# Patient Record
Sex: FEMALE | Race: WHITE | NOT HISPANIC OR LATINO
[De-identification: names, ages, dates, MRNs, and addresses within clinical notes are randomized per-mention and may not be internally consistent; named-entity substitution may affect disease eponyms.]

---

## 2017-09-11 ENCOUNTER — TRANSCRIPTION ENCOUNTER (OUTPATIENT)
Age: 27
End: 2017-09-11

## 2020-04-20 ENCOUNTER — ASOB RESULT (OUTPATIENT)
Age: 30
End: 2020-04-20

## 2020-04-20 ENCOUNTER — APPOINTMENT (OUTPATIENT)
Dept: ANTEPARTUM | Facility: CLINIC | Age: 30
End: 2020-04-20
Payer: COMMERCIAL

## 2020-04-20 ENCOUNTER — TRANSCRIPTION ENCOUNTER (OUTPATIENT)
Age: 30
End: 2020-04-20

## 2020-04-20 PROBLEM — Z00.00 ENCOUNTER FOR PREVENTIVE HEALTH EXAMINATION: Status: ACTIVE | Noted: 2020-04-20

## 2020-04-20 PROCEDURE — 76813 OB US NUCHAL MEAS 1 GEST: CPT

## 2020-06-24 ENCOUNTER — APPOINTMENT (OUTPATIENT)
Dept: ANTEPARTUM | Facility: CLINIC | Age: 30
End: 2020-06-24
Payer: COMMERCIAL

## 2020-06-24 ENCOUNTER — ASOB RESULT (OUTPATIENT)
Age: 30
End: 2020-06-24

## 2020-06-24 PROCEDURE — 76805 OB US >/= 14 WKS SNGL FETUS: CPT

## 2020-07-06 ENCOUNTER — APPOINTMENT (OUTPATIENT)
Dept: ANTEPARTUM | Facility: CLINIC | Age: 30
End: 2020-07-06
Payer: COMMERCIAL

## 2020-07-06 ENCOUNTER — ASOB RESULT (OUTPATIENT)
Age: 30
End: 2020-07-06

## 2020-07-06 PROCEDURE — 76816 OB US FOLLOW-UP PER FETUS: CPT

## 2020-09-15 ENCOUNTER — ASOB RESULT (OUTPATIENT)
Age: 30
End: 2020-09-15

## 2020-09-15 ENCOUNTER — APPOINTMENT (OUTPATIENT)
Dept: ANTEPARTUM | Facility: CLINIC | Age: 30
End: 2020-09-15
Payer: COMMERCIAL

## 2020-09-15 PROCEDURE — 76819 FETAL BIOPHYS PROFIL W/O NST: CPT

## 2020-09-15 PROCEDURE — 76820 UMBILICAL ARTERY ECHO: CPT

## 2020-09-15 PROCEDURE — 76816 OB US FOLLOW-UP PER FETUS: CPT

## 2020-10-13 ENCOUNTER — APPOINTMENT (OUTPATIENT)
Dept: ANTEPARTUM | Facility: CLINIC | Age: 30
End: 2020-10-13
Payer: COMMERCIAL

## 2020-10-13 ENCOUNTER — ASOB RESULT (OUTPATIENT)
Age: 30
End: 2020-10-13

## 2020-10-13 PROCEDURE — 76819 FETAL BIOPHYS PROFIL W/O NST: CPT

## 2020-10-13 PROCEDURE — 76816 OB US FOLLOW-UP PER FETUS: CPT

## 2020-10-14 ENCOUNTER — TRANSCRIPTION ENCOUNTER (OUTPATIENT)
Age: 30
End: 2020-10-14

## 2020-10-14 ENCOUNTER — INPATIENT (INPATIENT)
Facility: HOSPITAL | Age: 30
LOS: 1 days | Discharge: HOME | End: 2020-10-16
Attending: OBSTETRICS & GYNECOLOGY | Admitting: OBSTETRICS & GYNECOLOGY
Payer: COMMERCIAL

## 2020-10-14 VITALS
RESPIRATION RATE: 18 BRPM | SYSTOLIC BLOOD PRESSURE: 134 MMHG | HEART RATE: 100 BPM | TEMPERATURE: 99 F | DIASTOLIC BLOOD PRESSURE: 82 MMHG

## 2020-10-14 DIAGNOSIS — J45.909 UNSPECIFIED ASTHMA, UNCOMPLICATED: ICD-10-CM

## 2020-10-14 DIAGNOSIS — O26.893 OTHER SPECIFIED PREGNANCY RELATED CONDITIONS, THIRD TRIMESTER: ICD-10-CM

## 2020-10-14 DIAGNOSIS — Z3A.37 37 WEEKS GESTATION OF PREGNANCY: ICD-10-CM

## 2020-10-14 LAB
ALBUMIN SERPL ELPH-MCNC: 2.9 G/DL — LOW (ref 3.3–5)
ALP SERPL-CCNC: 187 U/L — HIGH (ref 40–120)
ALT FLD-CCNC: 15 U/L — SIGNIFICANT CHANGE UP (ref 12–78)
ANION GAP SERPL CALC-SCNC: 11 MMOL/L — SIGNIFICANT CHANGE UP (ref 5–17)
AST SERPL-CCNC: 13 U/L — LOW (ref 15–37)
BASOPHILS # BLD AUTO: 0.03 K/UL — SIGNIFICANT CHANGE UP (ref 0–0.2)
BASOPHILS NFR BLD AUTO: 0.2 % — SIGNIFICANT CHANGE UP (ref 0–2)
BILIRUB SERPL-MCNC: 0.7 MG/DL — SIGNIFICANT CHANGE UP (ref 0.2–1.2)
BUN SERPL-MCNC: 8 MG/DL — SIGNIFICANT CHANGE UP (ref 7–23)
CALCIUM SERPL-MCNC: 9.1 MG/DL — SIGNIFICANT CHANGE UP (ref 8.5–10.1)
CHLORIDE SERPL-SCNC: 110 MMOL/L — HIGH (ref 96–108)
CO2 SERPL-SCNC: 19 MMOL/L — LOW (ref 22–31)
CREAT SERPL-MCNC: 0.7 MG/DL — SIGNIFICANT CHANGE UP (ref 0.5–1.3)
EOSINOPHIL # BLD AUTO: 0.03 K/UL — SIGNIFICANT CHANGE UP (ref 0–0.5)
EOSINOPHIL NFR BLD AUTO: 0.2 % — SIGNIFICANT CHANGE UP (ref 0–6)
GLUCOSE SERPL-MCNC: 75 MG/DL — SIGNIFICANT CHANGE UP (ref 70–99)
HCT VFR BLD CALC: 39.3 % — SIGNIFICANT CHANGE UP (ref 34.5–45)
HGB BLD-MCNC: 13.3 G/DL — SIGNIFICANT CHANGE UP (ref 11.5–15.5)
IMM GRANULOCYTES NFR BLD AUTO: 0.5 % — SIGNIFICANT CHANGE UP (ref 0–1.5)
LYMPHOCYTES # BLD AUTO: 1.87 K/UL — SIGNIFICANT CHANGE UP (ref 1–3.3)
LYMPHOCYTES # BLD AUTO: 12.9 % — LOW (ref 13–44)
MCHC RBC-ENTMCNC: 29.4 PG — SIGNIFICANT CHANGE UP (ref 27–34)
MCHC RBC-ENTMCNC: 33.8 GM/DL — SIGNIFICANT CHANGE UP (ref 32–36)
MCV RBC AUTO: 86.9 FL — SIGNIFICANT CHANGE UP (ref 80–100)
MONOCYTES # BLD AUTO: 0.68 K/UL — SIGNIFICANT CHANGE UP (ref 0–0.9)
MONOCYTES NFR BLD AUTO: 4.7 % — SIGNIFICANT CHANGE UP (ref 2–14)
NEUTROPHILS # BLD AUTO: 11.78 K/UL — HIGH (ref 1.8–7.4)
NEUTROPHILS NFR BLD AUTO: 81.5 % — HIGH (ref 43–77)
PLATELET # BLD AUTO: 194 K/UL — SIGNIFICANT CHANGE UP (ref 150–400)
POTASSIUM SERPL-MCNC: 3.6 MMOL/L — SIGNIFICANT CHANGE UP (ref 3.5–5.3)
POTASSIUM SERPL-SCNC: 3.6 MMOL/L — SIGNIFICANT CHANGE UP (ref 3.5–5.3)
PROT SERPL-MCNC: 7.6 GM/DL — SIGNIFICANT CHANGE UP (ref 6–8.3)
RBC # BLD: 4.52 M/UL — SIGNIFICANT CHANGE UP (ref 3.8–5.2)
RBC # FLD: 13.8 % — SIGNIFICANT CHANGE UP (ref 10.3–14.5)
SARS-COV-2 RNA SPEC QL NAA+PROBE: SIGNIFICANT CHANGE UP
SODIUM SERPL-SCNC: 140 MMOL/L — SIGNIFICANT CHANGE UP (ref 135–145)
T PALLIDUM AB TITR SER: NEGATIVE — SIGNIFICANT CHANGE UP
WBC # BLD: 14.46 K/UL — HIGH (ref 3.8–10.5)
WBC # FLD AUTO: 14.46 K/UL — HIGH (ref 3.8–10.5)

## 2020-10-14 PROCEDURE — 85014 HEMATOCRIT: CPT

## 2020-10-14 PROCEDURE — G0463: CPT

## 2020-10-14 PROCEDURE — 36415 COLL VENOUS BLD VENIPUNCTURE: CPT

## 2020-10-14 PROCEDURE — U0003: CPT

## 2020-10-14 PROCEDURE — 94760 N-INVAS EAR/PLS OXIMETRY 1: CPT

## 2020-10-14 PROCEDURE — 80053 COMPREHEN METABOLIC PANEL: CPT

## 2020-10-14 PROCEDURE — 86900 BLOOD TYPING SEROLOGIC ABO: CPT

## 2020-10-14 PROCEDURE — C1889: CPT

## 2020-10-14 PROCEDURE — 86769 SARS-COV-2 COVID-19 ANTIBODY: CPT

## 2020-10-14 PROCEDURE — 86901 BLOOD TYPING SEROLOGIC RH(D): CPT

## 2020-10-14 PROCEDURE — 85018 HEMOGLOBIN: CPT

## 2020-10-14 PROCEDURE — 86850 RBC ANTIBODY SCREEN: CPT

## 2020-10-14 PROCEDURE — 86780 TREPONEMA PALLIDUM: CPT

## 2020-10-14 PROCEDURE — 85025 COMPLETE CBC W/AUTO DIFF WBC: CPT

## 2020-10-14 PROCEDURE — 59050 FETAL MONITOR W/REPORT: CPT

## 2020-10-14 RX ORDER — CITRIC ACID/SODIUM CITRATE 300-500 MG
15 SOLUTION, ORAL ORAL EVERY 6 HOURS
Refills: 0 | Status: DISCONTINUED | OUTPATIENT
Start: 2020-10-14 | End: 2020-10-14

## 2020-10-14 RX ORDER — ALBUTEROL 90 UG/1
2 AEROSOL, METERED ORAL EVERY 6 HOURS
Refills: 0 | Status: DISCONTINUED | OUTPATIENT
Start: 2020-10-14 | End: 2020-10-16

## 2020-10-14 RX ORDER — FAMOTIDINE 10 MG/ML
20 INJECTION INTRAVENOUS
Refills: 0 | Status: DISCONTINUED | OUTPATIENT
Start: 2020-10-14 | End: 2020-10-16

## 2020-10-14 RX ORDER — SENNA PLUS 8.6 MG/1
2 TABLET ORAL AT BEDTIME
Refills: 0 | Status: DISCONTINUED | OUTPATIENT
Start: 2020-10-14 | End: 2020-10-16

## 2020-10-14 RX ORDER — AER TRAVELER 0.5 G/1
1 SOLUTION RECTAL; TOPICAL EVERY 4 HOURS
Refills: 0 | Status: DISCONTINUED | OUTPATIENT
Start: 2020-10-14 | End: 2020-10-16

## 2020-10-14 RX ORDER — LANOLIN
1 OINTMENT (GRAM) TOPICAL EVERY 6 HOURS
Refills: 0 | Status: DISCONTINUED | OUTPATIENT
Start: 2020-10-14 | End: 2020-10-16

## 2020-10-14 RX ORDER — ACETAMINOPHEN 500 MG
975 TABLET ORAL
Refills: 0 | Status: DISCONTINUED | OUTPATIENT
Start: 2020-10-14 | End: 2020-10-16

## 2020-10-14 RX ORDER — SODIUM CHLORIDE 9 MG/ML
3 INJECTION INTRAMUSCULAR; INTRAVENOUS; SUBCUTANEOUS EVERY 8 HOURS
Refills: 0 | Status: DISCONTINUED | OUTPATIENT
Start: 2020-10-14 | End: 2020-10-14

## 2020-10-14 RX ORDER — IBUPROFEN 200 MG
600 TABLET ORAL EVERY 6 HOURS
Refills: 0 | Status: COMPLETED | OUTPATIENT
Start: 2020-10-14 | End: 2021-09-12

## 2020-10-14 RX ORDER — OXYCODONE HYDROCHLORIDE 5 MG/1
5 TABLET ORAL ONCE
Refills: 0 | Status: DISCONTINUED | OUTPATIENT
Start: 2020-10-14 | End: 2020-10-16

## 2020-10-14 RX ORDER — DIBUCAINE 1 %
1 OINTMENT (GRAM) RECTAL EVERY 6 HOURS
Refills: 0 | Status: DISCONTINUED | OUTPATIENT
Start: 2020-10-14 | End: 2020-10-16

## 2020-10-14 RX ORDER — SIMETHICONE 80 MG/1
80 TABLET, CHEWABLE ORAL EVERY 4 HOURS
Refills: 0 | Status: DISCONTINUED | OUTPATIENT
Start: 2020-10-14 | End: 2020-10-16

## 2020-10-14 RX ORDER — DIPHENHYDRAMINE HCL 50 MG
25 CAPSULE ORAL EVERY 6 HOURS
Refills: 0 | Status: DISCONTINUED | OUTPATIENT
Start: 2020-10-14 | End: 2020-10-16

## 2020-10-14 RX ORDER — HYDROCORTISONE 1 %
1 OINTMENT (GRAM) TOPICAL EVERY 6 HOURS
Refills: 0 | Status: DISCONTINUED | OUTPATIENT
Start: 2020-10-14 | End: 2020-10-16

## 2020-10-14 RX ORDER — MAGNESIUM HYDROXIDE 400 MG/1
30 TABLET, CHEWABLE ORAL
Refills: 0 | Status: DISCONTINUED | OUTPATIENT
Start: 2020-10-14 | End: 2020-10-16

## 2020-10-14 RX ORDER — KETOROLAC TROMETHAMINE 30 MG/ML
30 SYRINGE (ML) INJECTION ONCE
Refills: 0 | Status: DISCONTINUED | OUTPATIENT
Start: 2020-10-14 | End: 2020-10-14

## 2020-10-14 RX ORDER — AMPICILLIN TRIHYDRATE 250 MG
2 CAPSULE ORAL ONCE
Refills: 0 | Status: COMPLETED | OUTPATIENT
Start: 2020-10-14 | End: 2020-10-14

## 2020-10-14 RX ORDER — NIFEDIPINE 30 MG
30 TABLET, EXTENDED RELEASE 24 HR ORAL DAILY
Refills: 0 | Status: DISCONTINUED | OUTPATIENT
Start: 2020-10-14 | End: 2020-10-16

## 2020-10-14 RX ORDER — OXYTOCIN 10 UNIT/ML
333.33 VIAL (ML) INJECTION
Qty: 20 | Refills: 0 | Status: DISCONTINUED | OUTPATIENT
Start: 2020-10-14 | End: 2020-10-16

## 2020-10-14 RX ORDER — BENZOCAINE 10 %
1 GEL (GRAM) MUCOUS MEMBRANE EVERY 6 HOURS
Refills: 0 | Status: DISCONTINUED | OUTPATIENT
Start: 2020-10-14 | End: 2020-10-16

## 2020-10-14 RX ORDER — MONTELUKAST 4 MG/1
10 TABLET, CHEWABLE ORAL AT BEDTIME
Refills: 0 | Status: DISCONTINUED | OUTPATIENT
Start: 2020-10-14 | End: 2020-10-16

## 2020-10-14 RX ORDER — IBUPROFEN 200 MG
600 TABLET ORAL EVERY 6 HOURS
Refills: 0 | Status: DISCONTINUED | OUTPATIENT
Start: 2020-10-14 | End: 2020-10-16

## 2020-10-14 RX ORDER — PRAMOXINE HYDROCHLORIDE 150 MG/15G
1 AEROSOL, FOAM RECTAL EVERY 4 HOURS
Refills: 0 | Status: DISCONTINUED | OUTPATIENT
Start: 2020-10-14 | End: 2020-10-16

## 2020-10-14 RX ORDER — SODIUM CHLORIDE 9 MG/ML
1000 INJECTION, SOLUTION INTRAVENOUS
Refills: 0 | Status: DISCONTINUED | OUTPATIENT
Start: 2020-10-14 | End: 2020-10-14

## 2020-10-14 RX ORDER — AMPICILLIN TRIHYDRATE 250 MG
1 CAPSULE ORAL EVERY 4 HOURS
Refills: 0 | Status: DISCONTINUED | OUTPATIENT
Start: 2020-10-14 | End: 2020-10-14

## 2020-10-14 RX ADMIN — Medication 216 GRAM(S): at 10:05

## 2020-10-14 RX ADMIN — Medication 30 MILLIGRAM(S): at 13:21

## 2020-10-14 RX ADMIN — Medication 975 MILLIGRAM(S): at 20:34

## 2020-10-14 RX ADMIN — MONTELUKAST 10 MILLIGRAM(S): 4 TABLET, CHEWABLE ORAL at 22:04

## 2020-10-14 RX ADMIN — Medication 1000 MILLIUNIT(S)/MIN: at 12:57

## 2020-10-14 RX ADMIN — SODIUM CHLORIDE 125 MILLILITER(S): 9 INJECTION, SOLUTION INTRAVENOUS at 10:45

## 2020-10-14 RX ADMIN — SODIUM CHLORIDE 125 MILLILITER(S): 9 INJECTION, SOLUTION INTRAVENOUS at 09:55

## 2020-10-14 NOTE — DISCHARGE NOTE OB - MEDICATION SUMMARY - MEDICATIONS TO TAKE
I will START or STAY ON the medications listed below when I get home from the hospital:    Procardia XL 30 mg oral tablet, extended release  -- 1 tab(s) by mouth once a day  -- Indication: For continue for hypertension    Prenatal 1 Plus 1 oral tablet  -- 1 tab(s) by mouth once a day  -- Indication: For continue daily

## 2020-10-14 NOTE — DISCHARGE NOTE OB - PLAN OF CARE
recovery nothing per vagina for six weeks.  Please follow up in six weeks in the office for your postpartum exam.

## 2020-10-14 NOTE — DISCHARGE NOTE OB - CARE PROVIDER_API CALL
Jonathan Olivo  OBSTETRICS AND GYNECOLOGY  [address] [facility], [address] [place] [address]  Phone: [phone]  Fax: [phone]  Established Patient  Scheduled Appointment: 11/18/2020

## 2020-10-14 NOTE — DISCHARGE NOTE OB - HOSPITAL COURSE
29 yo  at 37 5/7 weeks gestation, GBS negative with essential HTN and asthma, presented c/o leaking fluid since 0600 and painful contractions.  Her cervix was dilated to 5cm upon arrival.  membranes were noted to be grossly ruptured.  She received epidural aanesthesia and progressed in labor. Her CBC upon admission:                        13.3   14.46 )-----------( 194      ( 14 Oct 2020 10:07 )             39.3    31 yo  at 37 5/7 weeks gestation, GBS negative with essential HTN and asthma, presented c/o leaking fluid since 0600 and painful contractions.  Her cervix was dilated to 5cm upon arrival.  membranes were noted to be grossly ruptured.  She received epidural aanesthesia and progressed in labor. Her CBC upon admission:                        13.3   14.46 )-----------( 194      ( 14 Oct 2020 10:07 )             39.3   Pt received pudendal block at full dilatation and pushed briefly to deliver a viable male infant from LOP position, with no nuchal cord, over a first degree laceration of the perineum at 12:47, Apgars 9/9. 29 yo  at 37 5/7 weeks gestation, GBS negative with essential HTN and asthma, presented c/o leaking fluid since 0600 and painful contractions.  Her cervix was dilated to 5cm upon arrival.  membranes were noted to be grossly ruptured.  She received epidural aanesthesia and progressed in labor. Her CBC upon admission:                        13.3   14.46 )-----------( 194      ( 14 Oct 2020 10:07 )             39.3   Pt received pudendal block at full dilatation and pushed briefly to deliver a viable male infant from LOP position, with no nuchal cord, over a first degree laceration of the perineum at 12:47, Apgars 9/9.  Her recovery was uneventful and she is being discharged home in stable condition on postpartum day #2.

## 2020-10-14 NOTE — DISCHARGE NOTE OB - PATIENT PORTAL LINK FT
You can access the FollowMyHealth Patient Portal offered by [facility] by registering at the following website: [email or web address]. By joining [employer]’s FollowMyHealth portal, you will also be able to view your health information using other applications (apps) compatible with our system.

## 2020-10-14 NOTE — DISCHARGE NOTE OB - CARE PLAN
Principal Discharge DX:	Vaginal delivery  Goal:	recovery  Assessment and plan of treatment:	nothing per vagina for six weeks.  Please follow up in six weeks in the office for your postpartum exam.

## 2020-10-15 LAB
HCT VFR BLD CALC: 33.2 % — LOW (ref 34.5–45)
HGB BLD-MCNC: 10.7 G/DL — LOW (ref 11.5–15.5)
SARS-COV-2 IGG SERPL QL IA: NEGATIVE — SIGNIFICANT CHANGE UP
SARS-COV-2 IGM SERPL IA-ACNC: 0.02 INDEX — SIGNIFICANT CHANGE UP

## 2020-10-15 RX ORDER — IBUPROFEN 200 MG
1 TABLET ORAL
Qty: 30 | Refills: 1
Start: 2020-10-15 | End: 2020-12-13

## 2020-10-15 RX ADMIN — Medication 975 MILLIGRAM(S): at 08:30

## 2020-10-15 RX ADMIN — Medication 30 MILLIGRAM(S): at 10:31

## 2020-10-15 RX ADMIN — Medication 1 TABLET(S): at 10:31

## 2020-10-15 RX ADMIN — Medication 975 MILLIGRAM(S): at 15:45

## 2020-10-15 RX ADMIN — Medication 975 MILLIGRAM(S): at 09:30

## 2020-10-15 RX ADMIN — SENNA PLUS 2 TABLET(S): 8.6 TABLET ORAL at 21:32

## 2020-10-15 RX ADMIN — Medication 975 MILLIGRAM(S): at 21:33

## 2020-10-15 RX ADMIN — Medication 975 MILLIGRAM(S): at 14:51

## 2020-10-15 RX ADMIN — Medication 600 MILLIGRAM(S): at 00:55

## 2020-10-15 RX ADMIN — Medication 600 MILLIGRAM(S): at 17:30

## 2020-10-15 RX ADMIN — Medication 600 MILLIGRAM(S): at 00:25

## 2020-10-15 RX ADMIN — MONTELUKAST 10 MILLIGRAM(S): 4 TABLET, CHEWABLE ORAL at 21:32

## 2020-10-15 RX ADMIN — Medication 600 MILLIGRAM(S): at 11:53

## 2020-10-15 RX ADMIN — Medication 975 MILLIGRAM(S): at 22:25

## 2020-10-15 NOTE — PROGRESS NOTE ADULT - SUBJECTIVE AND OBJECTIVE BOX
PPD #1      Tolerating uterine cramps.      T(C): 36.3 (10-15-20 @ 12:29), Max: 37 (10-15-20 @ 08:27)  HR: 83 (10-15-20 @ 12:29) (77 - 95)  BP: 122/77 (10-15-20 @ 12:29) (109/67 - 137/89)  RR: 17 (10-15-20 @ 12:29) (17 - 18)  SpO2: 96% (10-15-20 @ 12:29) (96% - 99%)  Wt(kg): --    Abd:  soft, NT  Fundus:  firm  - Lochia- Minimal  Extr:  no Pramod's sign, +1 pedal edema                        10.7   x     )-----------( x        ( 15 Oct 2020 07:16 )             33.2         A/P:  PPD#1       routine postpartum care and education  Breast feeding support and education.  Anticipate discharge home tomorrow.

## 2020-10-16 VITALS
RESPIRATION RATE: 18 BRPM | OXYGEN SATURATION: 98 % | DIASTOLIC BLOOD PRESSURE: 84 MMHG | SYSTOLIC BLOOD PRESSURE: 137 MMHG | HEART RATE: 79 BPM | TEMPERATURE: 99 F

## 2020-10-16 RX ADMIN — Medication 600 MILLIGRAM(S): at 01:25

## 2020-10-16 RX ADMIN — Medication 975 MILLIGRAM(S): at 10:27

## 2020-10-16 RX ADMIN — Medication 600 MILLIGRAM(S): at 07:15

## 2020-10-16 RX ADMIN — Medication 600 MILLIGRAM(S): at 06:18

## 2020-10-16 RX ADMIN — Medication 600 MILLIGRAM(S): at 00:25

## 2020-10-16 RX ADMIN — Medication 975 MILLIGRAM(S): at 09:45

## 2020-10-16 NOTE — PROGRESS NOTE ADULT - SUBJECTIVE AND OBJECTIVE BOX
PPD #2      eager to go home.    T(C): 37 (10-16-20 @ 08:55), Max: 37.1 (10-15-20 @ 16:30)  HR: 79 (10-16-20 @ 08:55) (70 - 85)  BP: 137/84 (10-16-20 @ 08:55) (122/75 - 137/84)  RR: 18 (10-16-20 @ 08:55) (17 - 18)  SpO2: 98% (10-16-20 @ 08:55) (96% - 98%)  Wt(kg): --    Abd:  soft, NT  Fundus:  firm  - Lochia- Minimal  Extr:  no Pramod's sign, no pedal edema                        10.7   x     )-----------( x        ( 15 Oct 2020 07:16 )             33.2         A/P:  PPD#2     , with first degree perineal laceration repair.  STable for discharge home  routine postpartum care and education  Breast feeding support and education.

## 2021-02-09 ENCOUNTER — INPATIENT (INPATIENT)
Facility: HOSPITAL | Age: 31
LOS: 0 days | Discharge: HOME | DRG: 419 | End: 2021-02-10
Attending: SURGERY | Admitting: SURGERY
Payer: COMMERCIAL

## 2021-02-09 ENCOUNTER — TRANSCRIPTION ENCOUNTER (OUTPATIENT)
Age: 31
End: 2021-02-09

## 2021-02-09 VITALS
HEIGHT: 66 IN | RESPIRATION RATE: 20 BRPM | WEIGHT: 175.05 LBS | TEMPERATURE: 98 F | SYSTOLIC BLOOD PRESSURE: 131 MMHG | HEART RATE: 126 BPM | OXYGEN SATURATION: 98 % | DIASTOLIC BLOOD PRESSURE: 87 MMHG

## 2021-02-09 DIAGNOSIS — K81.0 ACUTE CHOLECYSTITIS: ICD-10-CM

## 2021-02-09 LAB
ALBUMIN SERPL ELPH-MCNC: 4.4 G/DL — SIGNIFICANT CHANGE UP (ref 3.3–5.2)
ALP SERPL-CCNC: 147 U/L — HIGH (ref 40–120)
ALT FLD-CCNC: 38 U/L — HIGH
ANION GAP SERPL CALC-SCNC: 15 MMOL/L — SIGNIFICANT CHANGE UP (ref 5–17)
APPEARANCE UR: ABNORMAL
AST SERPL-CCNC: 28 U/L — SIGNIFICANT CHANGE UP
BACTERIA # UR AUTO: ABNORMAL
BASOPHILS # BLD AUTO: 0.04 K/UL — SIGNIFICANT CHANGE UP (ref 0–0.2)
BASOPHILS NFR BLD AUTO: 0.3 % — SIGNIFICANT CHANGE UP (ref 0–2)
BILIRUB SERPL-MCNC: 0.6 MG/DL — SIGNIFICANT CHANGE UP (ref 0.4–2)
BILIRUB UR-MCNC: NEGATIVE — SIGNIFICANT CHANGE UP
BLD GP AB SCN SERPL QL: SIGNIFICANT CHANGE UP
BUN SERPL-MCNC: 15 MG/DL — SIGNIFICANT CHANGE UP (ref 8–20)
CALCIUM SERPL-MCNC: 9.4 MG/DL — SIGNIFICANT CHANGE UP (ref 8.6–10.2)
CHLORIDE SERPL-SCNC: 103 MMOL/L — SIGNIFICANT CHANGE UP (ref 98–107)
CO2 SERPL-SCNC: 19 MMOL/L — LOW (ref 22–29)
COLOR SPEC: YELLOW — SIGNIFICANT CHANGE UP
COMMENT - URINE: SIGNIFICANT CHANGE UP
CREAT SERPL-MCNC: 0.63 MG/DL — SIGNIFICANT CHANGE UP (ref 0.5–1.3)
DIFF PNL FLD: ABNORMAL
EOSINOPHIL # BLD AUTO: 0.01 K/UL — SIGNIFICANT CHANGE UP (ref 0–0.5)
EOSINOPHIL NFR BLD AUTO: 0.1 % — SIGNIFICANT CHANGE UP (ref 0–6)
EPI CELLS # UR: SIGNIFICANT CHANGE UP
GLUCOSE SERPL-MCNC: 107 MG/DL — HIGH (ref 70–99)
GLUCOSE UR QL: NEGATIVE MG/DL — SIGNIFICANT CHANGE UP
HCG SERPL-ACNC: <4 MIU/ML — SIGNIFICANT CHANGE UP
HCT VFR BLD CALC: 43 % — SIGNIFICANT CHANGE UP (ref 34.5–45)
HGB BLD-MCNC: 14.8 G/DL — SIGNIFICANT CHANGE UP (ref 11.5–15.5)
IMM GRANULOCYTES NFR BLD AUTO: 0.3 % — SIGNIFICANT CHANGE UP (ref 0–1.5)
KETONES UR-MCNC: ABNORMAL
LEUKOCYTE ESTERASE UR-ACNC: ABNORMAL
LIDOCAIN IGE QN: 17 U/L — LOW (ref 22–51)
LYMPHOCYTES # BLD AUTO: 1.6 K/UL — SIGNIFICANT CHANGE UP (ref 1–3.3)
LYMPHOCYTES # BLD AUTO: 13.3 % — SIGNIFICANT CHANGE UP (ref 13–44)
MCHC RBC-ENTMCNC: 30.3 PG — SIGNIFICANT CHANGE UP (ref 27–34)
MCHC RBC-ENTMCNC: 34.4 GM/DL — SIGNIFICANT CHANGE UP (ref 32–36)
MCV RBC AUTO: 87.9 FL — SIGNIFICANT CHANGE UP (ref 80–100)
MONOCYTES # BLD AUTO: 0.47 K/UL — SIGNIFICANT CHANGE UP (ref 0–0.9)
MONOCYTES NFR BLD AUTO: 3.9 % — SIGNIFICANT CHANGE UP (ref 2–14)
NEUTROPHILS # BLD AUTO: 9.87 K/UL — HIGH (ref 1.8–7.4)
NEUTROPHILS NFR BLD AUTO: 82.1 % — HIGH (ref 43–77)
NITRITE UR-MCNC: NEGATIVE — SIGNIFICANT CHANGE UP
PH UR: 5 — SIGNIFICANT CHANGE UP (ref 5–8)
PLATELET # BLD AUTO: 302 K/UL — SIGNIFICANT CHANGE UP (ref 150–400)
POTASSIUM SERPL-MCNC: 4 MMOL/L — SIGNIFICANT CHANGE UP (ref 3.5–5.3)
POTASSIUM SERPL-SCNC: 4 MMOL/L — SIGNIFICANT CHANGE UP (ref 3.5–5.3)
PROT SERPL-MCNC: 8 G/DL — SIGNIFICANT CHANGE UP (ref 6.6–8.7)
PROT UR-MCNC: 100 MG/DL
RBC # BLD: 4.89 M/UL — SIGNIFICANT CHANGE UP (ref 3.8–5.2)
RBC # FLD: 13.1 % — SIGNIFICANT CHANGE UP (ref 10.3–14.5)
RBC CASTS # UR COMP ASSIST: ABNORMAL /HPF (ref 0–4)
SARS-COV-2 RNA SPEC QL NAA+PROBE: SIGNIFICANT CHANGE UP
SODIUM SERPL-SCNC: 137 MMOL/L — SIGNIFICANT CHANGE UP (ref 135–145)
SP GR SPEC: 1.02 — SIGNIFICANT CHANGE UP (ref 1.01–1.02)
UROBILINOGEN FLD QL: 1 MG/DL
WBC # BLD: 12.03 K/UL — HIGH (ref 3.8–10.5)
WBC # FLD AUTO: 12.03 K/UL — HIGH (ref 3.8–10.5)
WBC UR QL: SIGNIFICANT CHANGE UP

## 2021-02-09 PROCEDURE — 76705 ECHO EXAM OF ABDOMEN: CPT | Mod: 26,RT

## 2021-02-09 PROCEDURE — 99285 EMERGENCY DEPT VISIT HI MDM: CPT

## 2021-02-09 PROCEDURE — 99282 EMERGENCY DEPT VISIT SF MDM: CPT | Mod: 57

## 2021-02-09 RX ORDER — SODIUM CHLORIDE 9 MG/ML
1000 INJECTION INTRAMUSCULAR; INTRAVENOUS; SUBCUTANEOUS ONCE
Refills: 0 | Status: COMPLETED | OUTPATIENT
Start: 2021-02-09 | End: 2021-02-09

## 2021-02-09 RX ORDER — NIFEDIPINE 30 MG
1 TABLET, EXTENDED RELEASE 24 HR ORAL
Qty: 0 | Refills: 0 | DISCHARGE
Start: 2021-02-09

## 2021-02-09 RX ORDER — NIFEDIPINE 30 MG
1 TABLET, EXTENDED RELEASE 24 HR ORAL
Qty: 0 | Refills: 0 | DISCHARGE

## 2021-02-09 RX ORDER — SENNA PLUS 8.6 MG/1
2 TABLET ORAL
Qty: 0 | Refills: 0 | DISCHARGE
Start: 2021-02-09

## 2021-02-09 RX ORDER — NIFEDIPINE 30 MG
30 TABLET, EXTENDED RELEASE 24 HR ORAL DAILY
Refills: 0 | Status: DISCONTINUED | OUTPATIENT
Start: 2021-02-09 | End: 2021-02-10

## 2021-02-09 RX ORDER — SENNA PLUS 8.6 MG/1
2 TABLET ORAL AT BEDTIME
Refills: 0 | Status: DISCONTINUED | OUTPATIENT
Start: 2021-02-09 | End: 2021-02-10

## 2021-02-09 RX ORDER — ENOXAPARIN SODIUM 100 MG/ML
40 INJECTION SUBCUTANEOUS AT BEDTIME
Refills: 0 | Status: DISCONTINUED | OUTPATIENT
Start: 2021-02-09 | End: 2021-02-10

## 2021-02-09 RX ORDER — ONDANSETRON 8 MG/1
4 TABLET, FILM COATED ORAL ONCE
Refills: 0 | Status: COMPLETED | OUTPATIENT
Start: 2021-02-09 | End: 2021-02-09

## 2021-02-09 RX ORDER — TRAMADOL HYDROCHLORIDE 50 MG/1
50 TABLET ORAL EVERY 6 HOURS
Refills: 0 | Status: DISCONTINUED | OUTPATIENT
Start: 2021-02-09 | End: 2021-02-10

## 2021-02-09 RX ORDER — CEFOTETAN DISODIUM 1 G
2 VIAL (EA) INJECTION EVERY 12 HOURS
Refills: 0 | Status: DISCONTINUED | OUTPATIENT
Start: 2021-02-10 | End: 2021-02-10

## 2021-02-09 RX ORDER — CEFOTETAN DISODIUM 1 G
VIAL (EA) INJECTION
Refills: 0 | Status: DISCONTINUED | OUTPATIENT
Start: 2021-02-09 | End: 2021-02-10

## 2021-02-09 RX ORDER — SODIUM CHLORIDE 9 MG/ML
1000 INJECTION, SOLUTION INTRAVENOUS
Refills: 0 | Status: DISCONTINUED | OUTPATIENT
Start: 2021-02-09 | End: 2021-02-10

## 2021-02-09 RX ORDER — MORPHINE SULFATE 50 MG/1
4 CAPSULE, EXTENDED RELEASE ORAL ONCE
Refills: 0 | Status: DISCONTINUED | OUTPATIENT
Start: 2021-02-09 | End: 2021-02-09

## 2021-02-09 RX ORDER — CHLORHEXIDINE GLUCONATE 213 G/1000ML
1 SOLUTION TOPICAL
Refills: 0 | Status: DISCONTINUED | OUTPATIENT
Start: 2021-02-09 | End: 2021-02-10

## 2021-02-09 RX ORDER — ACETAMINOPHEN 500 MG
975 TABLET ORAL EVERY 8 HOURS
Refills: 0 | Status: DISCONTINUED | OUTPATIENT
Start: 2021-02-09 | End: 2021-02-10

## 2021-02-09 RX ORDER — CEFOTETAN DISODIUM 1 G
2 VIAL (EA) INJECTION ONCE
Refills: 0 | Status: COMPLETED | OUTPATIENT
Start: 2021-02-09 | End: 2021-02-09

## 2021-02-09 RX ORDER — ACETAMINOPHEN 500 MG
3 TABLET ORAL
Qty: 0 | Refills: 0 | DISCHARGE
Start: 2021-02-09

## 2021-02-09 RX ADMIN — SODIUM CHLORIDE 1000 MILLILITER(S): 9 INJECTION INTRAMUSCULAR; INTRAVENOUS; SUBCUTANEOUS at 11:25

## 2021-02-09 RX ADMIN — Medication 100 GRAM(S): at 14:10

## 2021-02-09 RX ADMIN — SENNA PLUS 2 TABLET(S): 8.6 TABLET ORAL at 23:01

## 2021-02-09 RX ADMIN — MORPHINE SULFATE 4 MILLIGRAM(S): 50 CAPSULE, EXTENDED RELEASE ORAL at 11:25

## 2021-02-09 RX ADMIN — Medication 975 MILLIGRAM(S): at 23:01

## 2021-02-09 RX ADMIN — ONDANSETRON 4 MILLIGRAM(S): 8 TABLET, FILM COATED ORAL at 09:16

## 2021-02-09 RX ADMIN — SODIUM CHLORIDE 100 MILLILITER(S): 9 INJECTION, SOLUTION INTRAVENOUS at 15:49

## 2021-02-09 RX ADMIN — SODIUM CHLORIDE 1000 MILLILITER(S): 9 INJECTION INTRAMUSCULAR; INTRAVENOUS; SUBCUTANEOUS at 09:16

## 2021-02-09 RX ADMIN — MORPHINE SULFATE 4 MILLIGRAM(S): 50 CAPSULE, EXTENDED RELEASE ORAL at 09:16

## 2021-02-09 NOTE — H&P ADULT - ASSESSMENT
30F, PMH of HTN, presenting 18-weeks postpartum with acute cholecystitis. [place] Grade 1.     Acute Cholecystitis  -Admit to Surgery under Dr. Richard Palm  -CLD/NPOaMN/IVF  -IV Cefotetan 2g q12hr  -Pain control, limit narcotics  -OR 2/10 Laparoscopic cholecystectomy, possible open    HTN  -Procardia 30mg daily with hold parameters  -Maintain normotensive    Miscellaneous  -DVT PPx  -Senna  -Lactation services, pt currently breastfeeding

## 2021-02-09 NOTE — ED ADULT NURSE NOTE - OBJECTIVE STATEMENT
Assumed care at 0910 pt co epigastric pain radiating to right side that started 0100 this morning, pt has hx gallstones, pt also co nausea but denies any fevers, chills, vomiting.

## 2021-02-09 NOTE — DISCHARGE NOTE PROVIDER - HOSPITAL COURSE
HPI: Ana Rosales is a 30 year-old female with a past medical history of hypertension and who is 18-weeks post-partum, presenting with RUQ abdominal pain radiating to the back x1-day. Pain is described as achy and is worsened with deep touch. It is associated with nausea and decreased appetite No episodes of emesis. Patient reports similar pain 1-year ago that was intermittent and resolved without intervention. No fevers. No CP or SOB.       Pt noted to have acute cholecystitis on US of RUQ 2/9.  Pt admitted.      Pt taken to OR 2/9............................................    A lactation consultant was consulted while pt was in house as pt is currently 18 weeks post partum and nursing.  Pt also is in need of a Primary Care Physician to monitor her HTN and Asthma.  Dr. Stephen' number has been provided to the pt to make an  out pt appointment.

## 2021-02-09 NOTE — H&P ADULT - HISTORY OF PRESENT ILLNESS
Ms. Rosales is a 30F, PMH of HTN, 18-weeks post-partum, presenting with RUQ abdominal pain radiating to the back x1-day. Pain is described as achy and is worsened with deep touch. It is associated with nausea and decreased appetite No episodes of emesis. Patient reports similar pain 1-year ago that was intermittent and resolved without intervention. No fevers. No CP or SOB.

## 2021-02-09 NOTE — ED ADULT TRIAGE NOTE - CHIEF COMPLAINT QUOTE
Patient arrived to ED today with c/o right sided pain that radiates into her back for the past 7 hours.

## 2021-02-09 NOTE — H&P ADULT - NSHPPHYSICALEXAM_GEN_ALL_CORE
Constitutional: Well-developed well nourished Female in no acute distress  Respiratory: Breath sounds CTA b/l respirations are unlabored   Cardiovascular: Regular rate & rhythm, +S1, S2  Gastrointestinal: Abdomen soft, ND, TTP RUQ, +[place]   Extremities: moving all extremities spontaneously, no point tenderness or deformity noted to upper or lower extremities b/l

## 2021-02-09 NOTE — H&P ADULT - NSHPLABSRESULTS_GEN_ALL_CORE
LABS:                        14.8   12.03 )-----------( 302      ( 2021 09:16 )             43.0         137  |  103  |  15.0  ----------------------------<  107<H>  4.0   |  19.0<L>  |  0.63    Ca    9.4      2021 09:16    TPro  8.0  /  Alb  4.4  /  TBili  0.6  /  DBili  x   /  AST  28  /  ALT  38<H>  /  AlkPhos  147<H>        Urinalysis Basic - ( 2021 10:03 )    Color: Yellow / Appearance: Slightly Turbid / S.020 / pH: x  Gluc: x / Ketone: Moderate  / Bili: Negative / Urobili: 1 mg/dL   Blood: x / Protein: 100 mg/dL / Nitrite: Negative   Leuk Esterase: Trace / RBC: 25-50 /HPF / WBC 3-5   Sq Epi: x / Non Sq Epi: Occasional / Bacteria: Moderate    IMAGING:  RUQ US: Bile ducts: Normal caliber. The visualized common bile duct measures up to 5 mm.  Gallbladder: Distended. 2.1 cm nonmobile gallstone in the gallbladder neck. Additional smaller gallstones in the gallbladder fundus. Borderline wall thickening measuring 3-4 mm. No pericholecystic fluid.

## 2021-02-09 NOTE — ED PROVIDER NOTE - ATTENDING CONTRIBUTION TO CARE
The patient seen and examined    RUQ abd pain    I, Duane Olivo, performed the initial face to face bedside interview with this patient regarding history of present illness, review of symptoms and relevant past medical, social and family history.  I completed an independent physical examination.  I was the initial provider who evaluated this patient. I have signed out the follow up of any pending tests (i.e. labs, radiological studies) to the medical student and ACP.  I have communicated the patient’s plan of care and disposition with the medical student and ACP.

## 2021-02-09 NOTE — ED PROVIDER NOTE - PROGRESS NOTE DETAILS
patient seen by attending, woke up this am at 1 am after eating Mac and Cheese with RUQ pain. patient states was told she had gallstones in past. positive nausea, no vomiting, no fevers or chills.   positive RUQ tenderness.  will follow up labs and sono. called surgery will eval

## 2021-02-09 NOTE — ED PROVIDER NOTE - PHYSICAL EXAMINATION
VITALS:   T(C): 36.6 (02-09-21 @ 08:00), Max: 36.6 (02-09-21 @ 08:00)  HR: 126 (02-09-21 @ 08:00) (126 - 126)  BP: 131/87 (02-09-21 @ 08:00) (131/87 - 131/87)  RR: 20 (02-09-21 @ 08:00) (20 - 20)  SpO2: 98% (02-09-21 @ 08:00) (98% - 98%)    GENERAL: NAD, lying in bed comfortably, AxO3  HEART: Regular rate and rhythm, no murmurs, rubs, or gallops  LUNGS: Unlabored respirations.  Clear to auscultation bilaterally, no crackles, wheezing, or rhonchi  ABDOMEN: Soft, tender to palpation at the RUQ, +Rangel sign.  SKIN: No rashes or lesions

## 2021-02-09 NOTE — ED PROVIDER NOTE - OBJECTIVE STATEMENT
Pt is a 31 y/o F with a hx of gallstones presenting with RUQ pain radiating to the back that started around 1AM. The pt has persisted at an 8/10, but the pain comes and goes. She describes the pain as a sharp cramp that worsens when she lies down. The pt explains that the pain is similar to the type of pain when she had gallstones a few years back which required no surgical intervention. Additionally she reports having had a fatty meal of mac and cheese over night as well which she assumes led to her sx onset. Otherwise the pt has been in a normal state of health other than having a baby four months ago, with no sick contacts and has never had abdominal surgery in the past. The pt reports no fever or chills. She does report some nausea, but no episodes of vomiting. She also discusses having developed diarrhea yesterday during the day.

## 2021-02-09 NOTE — PRE PROCEDURE NOTE - ATTENDING COMMENTS
The patient was seen and examined  Acute cholecystitis  I have explained risks, benefits, and alternatives to the patient.  She understands and consents.  Will proceed.

## 2021-02-09 NOTE — H&P ADULT - ATTENDING COMMENTS
The patient was seen and examined  Details per the resident's H&P  This is a 30 year old woman who presents with a 1 day history of RUQ colicky abdominal pain  Associated with nausea but no vomiting  Similar episodes in the past    Exam:  Afebrile  Abdomen is soft, RUQ tenderness, +Rangel's    Imaging reviewed    Impression:  Acute cholecystitis    Plan:  Admit  IV antibiotics  Plan for OR

## 2021-02-09 NOTE — ED ADULT NURSE REASSESSMENT NOTE - NS ED NURSE REASSESS COMMENT FT1
pt admitted. Lactation consultant at bedside to speak with patient regarding pumping and medications.

## 2021-02-09 NOTE — DISCHARGE NOTE PROVIDER - NSDCMRMEDTOKEN_GEN_ALL_CORE_FT
acetaminophen 325 mg oral tablet: 3 tab(s) orally every 8 hours  NIFEdipine 30 mg oral tablet, extended release: 1 tab(s) orally once a day  Prenatal 1 Plus 1 oral tablet: 1 tab(s) orally once a day  senna oral tablet: 2 tab(s) orally once a day (at bedtime)

## 2021-02-09 NOTE — PRE PROCEDURE NOTE - PRE PROCEDURE EVALUATION
Preoperative Note    Preop Dx: Acute Cholecystitis  Surgeon: Richard Palm DO  Procedure: Laparoscopic Cholecystectomy    Vital Signs Last 24 Hrs  T(C): 36.4 (09 Feb 2021 12:09), Max: 36.6 (09 Feb 2021 08:00)  T(F): 97.6 (09 Feb 2021 12:09), Max: 97.8 (09 Feb 2021 08:00)  HR: 94 (09 Feb 2021 12:09) (94 - 126)  BP: 146/96 (09 Feb 2021 12:09) (131/87 - 146/96)  BP(mean): --  RR: 20 (09 Feb 2021 12:09) (20 - 20)  SpO2: 99% (09 Feb 2021 12:09) (98% - 99%)                        14.8   12.03 )-----------( 302      ( 09 Feb 2021 09:16 )             43.0     02-09    137  |  103  |  15.0  ----------------------------<  107<H>  4.0   |  19.0<L>  |  0.63    Ca    9.4      09 Feb 2021 09:16    TPro  8.0  /  Alb  4.4  /  TBili  0.6  /  DBili  x   /  AST  28  /  ALT  38<H>  /  AlkPhos  147<H>  02-09      Daily Height in cm: 167.64 (09 Feb 2021 08:00)    Daily     EKG: Not indicated  CXR: Not indicated  Type and Screen: Performed 2/9/2021        A/P: 30y Female     - OR xx/xx/xx for _____________ with  _____  - NPO past midnight, except medications  - IVF while NPO  - Morning Labs: CBC, BMP, coags, type & screen  - Diabetic orders adjusted for NPO period.  - Consent signed and in chart  - Medical clearance for OR Preoperative Note    Pertinent HPI: Patient is a 30 year-old female with PMH of HTN, currently 18 weeks post-partum, with 1 day of RUQ abdominal pain consistent with acute cholecystitis.    Preop Dx: Acute Cholecystitis  Surgeon: Richard Palm DO  Procedure: Laparoscopic Cholecystectomy    Vital Signs Last 24 Hrs  T(C): 36.4 (09 Feb 2021 12:09), Max: 36.6 (09 Feb 2021 08:00)  T(F): 97.6 (09 Feb 2021 12:09), Max: 97.8 (09 Feb 2021 08:00)  HR: 94 (09 Feb 2021 12:09) (94 - 126)  BP: 146/96 (09 Feb 2021 12:09) (131/87 - 146/96)  BP(mean): --  RR: 20 (09 Feb 2021 12:09) (20 - 20)  SpO2: 99% (09 Feb 2021 12:09) (98% - 99%)                        14.8   12.03 )-----------( 302      ( 09 Feb 2021 09:16 )             43.0     02-09    137  |  103  |  15.0  ----------------------------<  107<H>  4.0   |  19.0<L>  |  0.63    Ca    9.4      09 Feb 2021 09:16    TPro  8.0  /  Alb  4.4  /  TBili  0.6  /  DBili  x   /  AST  28  /  ALT  38<H>  /  AlkPhos  147<H>  02-09      Daily Height in cm: 167.64 (09 Feb 2021 08:00)    Daily     EKG: Not indicated  CXR: Not indicated  Type and Screen: Performed 2/9/2021        A/P: 30y Female who is 18 weeks post-partum, admitted with acute cholecystitis, scheduled for lap renato.    - OR 2/9/21 for laparoscopic cholecystectomy with Dr. Richard Palm  - NPO past midnight, except medications  - IVF while NPO  - Morning Labs: CBC, BMP, coags, type & screen

## 2021-02-10 ENCOUNTER — RESULT REVIEW (OUTPATIENT)
Age: 31
End: 2021-02-10

## 2021-02-10 ENCOUNTER — TRANSCRIPTION ENCOUNTER (OUTPATIENT)
Age: 31
End: 2021-02-10

## 2021-02-10 VITALS
DIASTOLIC BLOOD PRESSURE: 74 MMHG | RESPIRATION RATE: 16 BRPM | SYSTOLIC BLOOD PRESSURE: 115 MMHG | HEART RATE: 75 BPM | OXYGEN SATURATION: 98 %

## 2021-02-10 DIAGNOSIS — K81.0 ACUTE CHOLECYSTITIS: ICD-10-CM

## 2021-02-10 LAB
ANION GAP SERPL CALC-SCNC: 11 MMOL/L — SIGNIFICANT CHANGE UP (ref 5–17)
APTT BLD: 30.9 SEC — SIGNIFICANT CHANGE UP (ref 27.5–35.5)
BASOPHILS # BLD AUTO: 0.03 K/UL — SIGNIFICANT CHANGE UP (ref 0–0.2)
BASOPHILS NFR BLD AUTO: 0.5 % — SIGNIFICANT CHANGE UP (ref 0–2)
BUN SERPL-MCNC: 11 MG/DL — SIGNIFICANT CHANGE UP (ref 8–20)
CALCIUM SERPL-MCNC: 8.8 MG/DL — SIGNIFICANT CHANGE UP (ref 8.6–10.2)
CHLORIDE SERPL-SCNC: 106 MMOL/L — SIGNIFICANT CHANGE UP (ref 98–107)
CO2 SERPL-SCNC: 23 MMOL/L — SIGNIFICANT CHANGE UP (ref 22–29)
CREAT SERPL-MCNC: 0.9 MG/DL — SIGNIFICANT CHANGE UP (ref 0.5–1.3)
EOSINOPHIL # BLD AUTO: 0.13 K/UL — SIGNIFICANT CHANGE UP (ref 0–0.5)
EOSINOPHIL NFR BLD AUTO: 2.2 % — SIGNIFICANT CHANGE UP (ref 0–6)
GLUCOSE SERPL-MCNC: 90 MG/DL — SIGNIFICANT CHANGE UP (ref 70–99)
HCT VFR BLD CALC: 39.3 % — SIGNIFICANT CHANGE UP (ref 34.5–45)
HGB BLD-MCNC: 13 G/DL — SIGNIFICANT CHANGE UP (ref 11.5–15.5)
IMM GRANULOCYTES NFR BLD AUTO: 0.2 % — SIGNIFICANT CHANGE UP (ref 0–1.5)
INR BLD: 1.09 RATIO — SIGNIFICANT CHANGE UP (ref 0.88–1.16)
LYMPHOCYTES # BLD AUTO: 2.18 K/UL — SIGNIFICANT CHANGE UP (ref 1–3.3)
LYMPHOCYTES # BLD AUTO: 37.2 % — SIGNIFICANT CHANGE UP (ref 13–44)
MAGNESIUM SERPL-MCNC: 1.8 MG/DL — SIGNIFICANT CHANGE UP (ref 1.6–2.6)
MCHC RBC-ENTMCNC: 30 PG — SIGNIFICANT CHANGE UP (ref 27–34)
MCHC RBC-ENTMCNC: 33.1 GM/DL — SIGNIFICANT CHANGE UP (ref 32–36)
MCV RBC AUTO: 90.6 FL — SIGNIFICANT CHANGE UP (ref 80–100)
MONOCYTES # BLD AUTO: 0.46 K/UL — SIGNIFICANT CHANGE UP (ref 0–0.9)
MONOCYTES NFR BLD AUTO: 7.8 % — SIGNIFICANT CHANGE UP (ref 2–14)
NEUTROPHILS # BLD AUTO: 3.05 K/UL — SIGNIFICANT CHANGE UP (ref 1.8–7.4)
NEUTROPHILS NFR BLD AUTO: 52.1 % — SIGNIFICANT CHANGE UP (ref 43–77)
PHOSPHATE SERPL-MCNC: 4 MG/DL — SIGNIFICANT CHANGE UP (ref 2.4–4.7)
PLATELET # BLD AUTO: 240 K/UL — SIGNIFICANT CHANGE UP (ref 150–400)
POTASSIUM SERPL-MCNC: 4 MMOL/L — SIGNIFICANT CHANGE UP (ref 3.5–5.3)
POTASSIUM SERPL-SCNC: 4 MMOL/L — SIGNIFICANT CHANGE UP (ref 3.5–5.3)
PROTHROM AB SERPL-ACNC: 12.6 SEC — SIGNIFICANT CHANGE UP (ref 10.6–13.6)
RBC # BLD: 4.34 M/UL — SIGNIFICANT CHANGE UP (ref 3.8–5.2)
RBC # FLD: 13.6 % — SIGNIFICANT CHANGE UP (ref 10.3–14.5)
SARS-COV-2 IGG SERPL QL IA: NEGATIVE — SIGNIFICANT CHANGE UP
SARS-COV-2 IGM SERPL IA-ACNC: 0.07 INDEX — SIGNIFICANT CHANGE UP
SODIUM SERPL-SCNC: 139 MMOL/L — SIGNIFICANT CHANGE UP (ref 135–145)
WBC # BLD: 5.86 K/UL — SIGNIFICANT CHANGE UP (ref 3.8–10.5)
WBC # FLD AUTO: 5.86 K/UL — SIGNIFICANT CHANGE UP (ref 3.8–10.5)

## 2021-02-10 PROCEDURE — 96376 TX/PRO/DX INJ SAME DRUG ADON: CPT

## 2021-02-10 PROCEDURE — U0005: CPT

## 2021-02-10 PROCEDURE — 93010 ELECTROCARDIOGRAM REPORT: CPT

## 2021-02-10 PROCEDURE — 86901 BLOOD TYPING SEROLOGIC RH(D): CPT

## 2021-02-10 PROCEDURE — 76705 ECHO EXAM OF ABDOMEN: CPT

## 2021-02-10 PROCEDURE — 80048 BASIC METABOLIC PNL TOTAL CA: CPT

## 2021-02-10 PROCEDURE — 86900 BLOOD TYPING SEROLOGIC ABO: CPT

## 2021-02-10 PROCEDURE — 86850 RBC ANTIBODY SCREEN: CPT

## 2021-02-10 PROCEDURE — 99285 EMERGENCY DEPT VISIT HI MDM: CPT | Mod: 25

## 2021-02-10 PROCEDURE — 85025 COMPLETE CBC W/AUTO DIFF WBC: CPT

## 2021-02-10 PROCEDURE — 96361 HYDRATE IV INFUSION ADD-ON: CPT

## 2021-02-10 PROCEDURE — 88304 TISSUE EXAM BY PATHOLOGIST: CPT

## 2021-02-10 PROCEDURE — 85730 THROMBOPLASTIN TIME PARTIAL: CPT

## 2021-02-10 PROCEDURE — 96375 TX/PRO/DX INJ NEW DRUG ADDON: CPT

## 2021-02-10 PROCEDURE — 47562 LAPAROSCOPIC CHOLECYSTECTOMY: CPT

## 2021-02-10 PROCEDURE — 83690 ASSAY OF LIPASE: CPT

## 2021-02-10 PROCEDURE — 96374 THER/PROPH/DIAG INJ IV PUSH: CPT

## 2021-02-10 PROCEDURE — 88304 TISSUE EXAM BY PATHOLOGIST: CPT | Mod: 26

## 2021-02-10 PROCEDURE — 36415 COLL VENOUS BLD VENIPUNCTURE: CPT

## 2021-02-10 PROCEDURE — 93005 ELECTROCARDIOGRAM TRACING: CPT

## 2021-02-10 PROCEDURE — 81001 URINALYSIS AUTO W/SCOPE: CPT

## 2021-02-10 PROCEDURE — 84100 ASSAY OF PHOSPHORUS: CPT

## 2021-02-10 PROCEDURE — 80053 COMPREHEN METABOLIC PANEL: CPT

## 2021-02-10 PROCEDURE — 86769 SARS-COV-2 COVID-19 ANTIBODY: CPT

## 2021-02-10 PROCEDURE — 83735 ASSAY OF MAGNESIUM: CPT

## 2021-02-10 PROCEDURE — C1889: CPT

## 2021-02-10 PROCEDURE — 84702 CHORIONIC GONADOTROPIN TEST: CPT

## 2021-02-10 PROCEDURE — U0003: CPT

## 2021-02-10 PROCEDURE — 85610 PROTHROMBIN TIME: CPT

## 2021-02-10 RX ORDER — MAGNESIUM SULFATE 500 MG/ML
2 VIAL (ML) INJECTION ONCE
Refills: 0 | Status: DISCONTINUED | OUTPATIENT
Start: 2021-02-10 | End: 2021-02-10

## 2021-02-10 RX ORDER — TRAMADOL HYDROCHLORIDE 50 MG/1
1 TABLET ORAL
Qty: 16 | Refills: 0
Start: 2021-02-10

## 2021-02-10 RX ORDER — FENTANYL CITRATE 50 UG/ML
50 INJECTION INTRAVENOUS
Refills: 0 | Status: DISCONTINUED | OUTPATIENT
Start: 2021-02-10 | End: 2021-02-10

## 2021-02-10 RX ORDER — TRAMADOL HYDROCHLORIDE 50 MG/1
25 TABLET ORAL EVERY 4 HOURS
Refills: 0 | Status: DISCONTINUED | OUTPATIENT
Start: 2021-02-10 | End: 2021-02-10

## 2021-02-10 RX ORDER — TRAMADOL HYDROCHLORIDE 50 MG/1
50 TABLET ORAL EVERY 4 HOURS
Refills: 0 | Status: DISCONTINUED | OUTPATIENT
Start: 2021-02-10 | End: 2021-02-10

## 2021-02-10 RX ORDER — SODIUM CHLORIDE 9 MG/ML
1000 INJECTION, SOLUTION INTRAVENOUS
Refills: 0 | Status: DISCONTINUED | OUTPATIENT
Start: 2021-02-10 | End: 2021-02-10

## 2021-02-10 RX ORDER — ONDANSETRON 8 MG/1
4 TABLET, FILM COATED ORAL ONCE
Refills: 0 | Status: COMPLETED | OUTPATIENT
Start: 2021-02-10 | End: 2021-02-10

## 2021-02-10 RX ORDER — ACETAMINOPHEN 500 MG
2 TABLET ORAL
Qty: 120 | Refills: 0
Start: 2021-02-10

## 2021-02-10 RX ADMIN — Medication 975 MILLIGRAM(S): at 05:18

## 2021-02-10 RX ADMIN — CHLORHEXIDINE GLUCONATE 1 APPLICATION(S): 213 SOLUTION TOPICAL at 11:11

## 2021-02-10 RX ADMIN — Medication 30 MILLIGRAM(S): at 05:18

## 2021-02-10 RX ADMIN — ONDANSETRON 4 MILLIGRAM(S): 8 TABLET, FILM COATED ORAL at 15:15

## 2021-02-10 RX ADMIN — FENTANYL CITRATE 50 MICROGRAM(S): 50 INJECTION INTRAVENOUS at 15:15

## 2021-02-10 RX ADMIN — Medication 100 GRAM(S): at 05:18

## 2021-02-10 RX ADMIN — FENTANYL CITRATE 50 MICROGRAM(S): 50 INJECTION INTRAVENOUS at 15:30

## 2021-02-10 NOTE — ASU DISCHARGE PLAN (ADULT/PEDIATRIC) - PROVIDER TOKENS
FREE:[LAST:[[facility]],PHONE:[phone]],FAX:[(   )    -],ADDRESS:[[address] [place] [address]],FOLLOWUP:[2 weeks]] FREE:[LAST:[[facility]],PHONE:[phone]],FAX:[(   )    -],ADDRESS:[[address] [place] [address]],FOLLOWUP:[2 weeks]],PROVIDER:[TOKEN:[[id number]]]

## 2021-02-10 NOTE — DISCHARGE NOTE PROVIDER - NSDCCPCAREPLAN_GEN_ALL_CORE_FT
PRINCIPAL DISCHARGE DIAGNOSIS  Diagnosis: Cholecystitis, acute  Assessment and Plan of Treatment:   BATHING: Please do not submerge wound underwater. You may shower and/or sponge bathe.   ACTIVITY: No heavy lifting or straining. Otherwise, you may return to your usual level of physical activity. If you are taking narcotic pain medication (such as Percocet) DO NOT drive a car, operate machinery or make important decisions.  DIET: Patient is advised to RETURN TO THE EMERGENCY DEPARTMENT for any of the following - worsening pain, fever/chills, nausea/vomiting, altered mental status, chest pain, shortness of breath, or any other new / worsening symptom. to your usual diet.  NOTIFY YOUR SURGEON IF: You have any bleeding that does not stop, any pus draining from your wound(s), any fever (over 100.4 F) or chills, persistent nausea/vomiting, persistent diarrhea, or if your pain is not controlled on your discharge pain medications.  FOLLOW-UP: Please follow up with your primary care physician and Acute Care Surgery clinic [phone] in 10-14 days regarding your hospitalization. Call for appointment upon discharge.

## 2021-02-10 NOTE — DISCHARGE NOTE PROVIDER - NSFOLLOWUPCLINICS_GEN_ALL_ED_FT
[facility] Acute Care Surgery  Acute Care Surgery  [address] [place] [address]  Phone: [phone]  Fax:   Follow Up Time: 2 weeks

## 2021-02-10 NOTE — DISCHARGE NOTE PROVIDER - NSDCMRMEDTOKEN_GEN_ALL_CORE_FT
acetaminophen 325 mg oral tablet: 3 tab(s) orally every 8 hours  NIFEdipine 30 mg oral tablet, extended release: 1 tab(s) orally once a day  Prenatal 1 Plus 1 oral tablet: 1 tab(s) orally once a day  senna oral tablet: 2 tab(s) orally once a day (at bedtime)   acetaminophen 325 mg oral tablet: 3 tab(s) orally every 8 hours  acetaminophen 500 mg oral tablet: 2 tab(s) orally every 6 hours, As Needed -for mild pain MDD:4G  NIFEdipine 30 mg oral tablet, extended release: 1 tab(s) orally once a day  Prenatal 1 Plus 1 oral tablet: 1 tab(s) orally once a day  senna oral tablet: 2 tab(s) orally once a day (at bedtime)  traMADol 50 mg oral tablet: 1 tab(s) orally every 6 hours, As Needed -for severe pain MDD:10 tabs

## 2021-02-10 NOTE — BRIEF OPERATIVE NOTE - NSICDXBRIEFPROCEDURE_GEN_ALL_CORE_FT
PROCEDURES:  Laparoscopic cholecystectomy using multiple ports 10-Feb-2021 14:05:01  Yumiko Woodall

## 2021-02-10 NOTE — DISCHARGE NOTE PROVIDER - HOSPITAL COURSE
HPI: HPI: Ana Rosales is a 30 year-old female patient with a past medical history of hypertension and asthma, 18 weeks post-partum, who presented to the [facility] ER on 2/9/2021 with 1 day of right upper quadrant abdominal pain. He pain, which she described as achy and worsened with deep palpation, also radiated to her back. Her pain was associated with nausea and decreased appetite, although she did not have any episodes of emesis. She reported similar pain 1 year ago that was intermittent and resolved without intervention. The patient denied any other symptoms, to include fever, chills, chest pain, shortness of breath, or urinary symptoms.    Hospital Course: Workup in the ED included a CBC, which showed leukoycytosis, and an ultrasound of her gallbladder, which showed cholelithiasis and borderline gallbladder wall thickening. Both of these were consistent with acute cholecystitis. Based on these findings, the patient was made NPO, started on IV antibiotics, and admitted to Surgery. Durign the course of admission, the patient was consented and scheduled for HPI: Ana Rosales is a 30 year-old female patient with a past medical history of hypertension and asthma, 18 weeks post-partum, who presented to the [facility] ER on 2/9/2021 with 1 day of right upper quadrant abdominal pain. He pain, which she described as achy and worsened with deep palpation, also radiated to her back. Her pain was associated with nausea and decreased appetite, although she did not have any episodes of emesis. She reported similar pain 1 year ago that was intermittent and resolved without intervention. The patient denied any other symptoms, to include fever, chills, chest pain, shortness of breath, or urinary symptoms.    Hospital Course: Workup in the ED included a CBC, which showed leukoycytosis, and an ultrasound of her gallbladder, which showed cholelithiasis and borderline gallbladder wall thickening. Both of these were consistent with acute cholecystitis. Based on these findings, the patient was made NPO, started on IV antibiotics, and admitted to Surgery. Durign the course of admission, the patient was consented and scheduled for a laparoscopic cholecystectomy, which took place on 2/10/2021 without issue. Her post-operative course was unremarkable.    Following her surgery, the patient tolerated a regular diet, ambulated, voided without difficulty, and had her pain controlled with oral medications. As a results, she was discharged to home on 2/11/2021. The patient was provided with appropriate wound care instructions and instructed to return to the ER if she developed fever, chest pain, shortness of breath, persistent nausea or vomiting, severe pain not responsive to oral medications, significant bleeding not responsive to a few minutes of direct pressure, or any other symptoms she found concerning.

## 2021-02-10 NOTE — PROGRESS NOTE ADULT - SUBJECTIVE AND OBJECTIVE BOX
SUBJECTIVE/24 hour events:  Patient is a 30yFemale comes in with abdominal pain found to have acute cholecystitis and going for lap renato. Patient had no acute events overnight, pain controlled, on abx, covid negative, NPO on IV hydration.       Vital Signs Last 24 Hrs  T(C): 36.7 (09 Feb 2021 23:29), Max: 37.2 (09 Feb 2021 19:20)  T(F): 98 (09 Feb 2021 23:29), Max: 99 (09 Feb 2021 19:20)  HR: 82 (09 Feb 2021 23:29) (73 - 126)  BP: 134/85 (09 Feb 2021 23:29) (131/87 - 146/96)  BP(mean): --  RR: 18 (09 Feb 2021 19:20) (18 - 20)  SpO2: 99% (09 Feb 2021 23:29) (98% - 100%)  Drug Dosing Weight  Height (cm): 167.6 (09 Feb 2021 08:36)  Weight (kg): 79.379 (09 Feb 2021 08:36)  BMI (kg/m2): 28.3 (09 Feb 2021 08:36)  BSA (m2): 1.89 (09 Feb 2021 08:36)  I&O's Detail    Allergies    No Known Allergies    Intolerances                              14.8   12.03 )-----------( 302      ( 09 Feb 2021 09:16 )             43.0   02-09    137  |  103  |  15.0  ----------------------------<  107<H>  4.0   |  19.0<L>  |  0.63    Ca    9.4      09 Feb 2021 09:16    TPro  8.0  /  Alb  4.4  /  TBili  0.6  /  DBili  x   /  AST  28  /  ALT  38<H>  /  AlkPhos  147<H>  02-09      ROS:    PHYSICAL EXAM:  Constitutional: in good spirits      Respiratory: no respiratory distress, no conversational dyspnea, no supplemental o2 needed    Gastrointestinal: abdomen soft, mild ttp to deep palpation to ruq, no guarding, no peritonitis     Genitourinary: voiding spontaneously     Neurological: A&Ox3    Skin: warm, dry and no rashes         MEDICATIONS  (STANDING):  acetaminophen   Tablet .. 975 milliGRAM(s) Oral every 8 hours  cefoTEtan  IVPB      cefoTEtan  IVPB 2 Gram(s) IV Intermittent every 12 hours  chlorhexidine 4% Liquid 1 Application(s) Topical <User Schedule>  enoxaparin Injectable 40 milliGRAM(s) SubCutaneous at bedtime  lactated ringers. 1000 milliLiter(s) (100 mL/Hr) IV Continuous <Continuous>  NIFEdipine XL 30 milliGRAM(s) Oral daily  senna 2 Tablet(s) Oral at bedtime    MEDICATIONS  (PRN):  traMADol 50 milliGRAM(s) Oral every 6 hours PRN Moderate Pain (4 - 6)      RADIOLOGY STUDIES:    CULTURES:

## 2021-02-10 NOTE — ASU DISCHARGE PLAN (ADULT/PEDIATRIC) - CARE PROVIDER_API CALL
[facility],   [address] [place] [address]  Phone: [phone]  Fax: (   )    -  Follow Up Time: 2 weeks   [facility],   [address] [place] [address]  Phone: [phone]  Fax: (   )    -  Follow Up Time: 2 weeks    Richard Palm (DO)  Surgery; Surgical Critical Care  [address] [facility], [address] [place] [address]  Phone: [phone]  Fax: [phone]  Follow Up Time:

## 2021-02-10 NOTE — ASU DISCHARGE PLAN (ADULT/PEDIATRIC) - ASU DC SPECIAL INSTRUCTIONSFT
1) Regular diet as tolerated  2) OK to shower in 24hours, no immersion in water for 2 weeks or until cleared by surgeon  3) Activity as tolerated EXCEPT no heavy lifting >25lbs for 1 week  4) Call clinic to make appointment in 2 weeks with [facility]  5) DO NOT DRIVE while taking narcotic pain medication  6) Over the Counter Tylenol and Ibuprofen (with food) for mild to moderate pain, Tramadol prescription for severe pain  7) Call clinic with concerns regarding persistent fevers, severe pain not relieved with medications, persistent nausea/vomiting, redness surrounding wound or purulent drainage from wound

## 2021-02-17 LAB — SURGICAL PATHOLOGY STUDY: SIGNIFICANT CHANGE UP

## 2021-02-23 ENCOUNTER — APPOINTMENT (OUTPATIENT)
Dept: TRAUMA SURGERY | Facility: CLINIC | Age: 31
End: 2021-02-23
Payer: COMMERCIAL

## 2021-02-23 VITALS
SYSTOLIC BLOOD PRESSURE: 124 MMHG | OXYGEN SATURATION: 96 % | BODY MASS INDEX: 30.39 KG/M2 | HEIGHT: 64 IN | DIASTOLIC BLOOD PRESSURE: 89 MMHG | TEMPERATURE: 97.2 F | WEIGHT: 178 LBS | HEART RATE: 87 BPM

## 2021-02-23 DIAGNOSIS — Z90.49 ACQUIRED ABSENCE OF OTHER SPECIFIED PARTS OF DIGESTIVE TRACT: ICD-10-CM

## 2021-02-23 PROBLEM — I10 ESSENTIAL (PRIMARY) HYPERTENSION: Chronic | Status: ACTIVE | Noted: 2021-02-09

## 2021-02-23 PROCEDURE — 99024 POSTOP FOLLOW-UP VISIT: CPT

## 2021-02-24 PROBLEM — Z90.49 STATUS POST LAPAROSCOPIC CHOLECYSTECTOMY: Status: ACTIVE | Noted: 2021-02-24

## 2021-02-24 NOTE — VITALS
[Tender] : tender [Occasional] : occasional [FreeTextEntry3] : umbilical  incision site [FreeTextEntry1] : rest [FreeTextEntry2] : lifting

## 2021-02-24 NOTE — ASSESSMENT
[FreeTextEntry1] : RTC prn \par No heavy  lifting pushing  or  pulling  for  the  next  2 weeks \par Any acute symptoms  and  or  concerns call back [facility] or  go  to  [facility]

## 2021-02-24 NOTE — PHYSICAL EXAM
[Normal Breath Sounds] : Normal breath sounds [Normal Heart Sounds] : normal heart sounds [Abdominal Masses] : No abdominal masses [Abdomen Tenderness] : ~T ~M No abdominal tenderness [No Rash or Lesion] : No rash or lesion [Petechiae] : no petechiae [Skin Ulcer] : no ulcer [Skin Induration] : no induration [Alert] : alert [Oriented to Person] : oriented to person [Oriented to Place] : oriented to place [Oriented to Time] : oriented to time [Calm] : calm [de-identified] : wdwn  female  in  nad [de-identified] : non icteric sclera  PERRLA  EOMS intact and  nl [de-identified] : trachea  midline [de-identified] : soft  non  tender  no distension  of abdomen   all incision sites  c/d/i  no  signs of  cellulitis  no  seroma formation   Slight ecchymosis  at umbilical incision site no  edema    no  guarding  no rebound

## 2021-02-24 NOTE — HISTORY OF PRESENT ILLNESS
[FreeTextEntry1] : Patient presents  to [facility]  clinic for  post op exam  s/p laparoscopic cholecystectomy  Patient time  of  this  exam  denies any  fever no  chills no  n/v/d nl bm nl urination   slight  discomfort at umbilical site Patient states  she  may have  done  too much  lifting yesterday  doing  laundry

## 2021-06-19 ENCOUNTER — TRANSCRIPTION ENCOUNTER (OUTPATIENT)
Age: 31
End: 2021-06-19

## 2021-07-23 ENCOUNTER — APPOINTMENT (OUTPATIENT)
Dept: ULTRASOUND IMAGING | Facility: CLINIC | Age: 31
End: 2021-07-23
Payer: COMMERCIAL

## 2021-07-23 ENCOUNTER — OUTPATIENT (OUTPATIENT)
Dept: OUTPATIENT SERVICES | Facility: HOSPITAL | Age: 31
LOS: 1 days | End: 2021-07-23
Payer: COMMERCIAL

## 2021-07-23 ENCOUNTER — TRANSCRIPTION ENCOUNTER (OUTPATIENT)
Age: 31
End: 2021-07-23

## 2021-07-23 DIAGNOSIS — Z00.8 ENCOUNTER FOR OTHER GENERAL EXAMINATION: ICD-10-CM

## 2021-07-23 PROCEDURE — 76856 US EXAM PELVIC COMPLETE: CPT

## 2021-07-23 PROCEDURE — 76830 TRANSVAGINAL US NON-OB: CPT | Mod: 26

## 2021-07-23 PROCEDURE — 76830 TRANSVAGINAL US NON-OB: CPT

## 2021-07-23 PROCEDURE — 76856 US EXAM PELVIC COMPLETE: CPT | Mod: 26

## 2021-11-29 ENCOUNTER — TRANSCRIPTION ENCOUNTER (OUTPATIENT)
Age: 31
End: 2021-11-29

## 2022-02-26 ENCOUNTER — TRANSCRIPTION ENCOUNTER (OUTPATIENT)
Age: 32
End: 2022-02-26

## 2022-04-19 NOTE — ED ADULT NURSE NOTE - NS ED NURSE LEVEL OF CONSCIOUSNESS ORIENTATION
Provider: DR RICHARDSON  Caller: ALONSO YUSUF  Relationship to Patient: SELF    Phone Number: [phone]    Reason for Call: PT IS CALLING TO SEE IF SHE NEEDS TO RESCHEDULE HER APPT W/ DR RICHARDSON ON 4/21. SHE JUST HAD SURGERY W/ DR BUI ON 4/15 AND HER POST-OP WITH HER IS 4/20.    PLEASE CALL PT TO LET HER KNOW IF SHE NEEDS TO R/S FOLLOW UP W/ DR RICHARDSON. PT STATES SHE WOULD PREFER TO R/S THE APPT.    PT CAN BE REACHED ANYTIME; OKAY TO LEAVE MESSAGE IF NO ANSWER       Oriented - self; Oriented - place; Oriented - time

## 2022-10-18 ENCOUNTER — NON-APPOINTMENT (OUTPATIENT)
Age: 32
End: 2022-10-18

## 2023-01-30 NOTE — ED ADULT NURSE NOTE - NSIMPLEMENTINTERV_GEN_ALL_ED
2023       Todd Bojorquez  [address] [address]  Via In Basket      Patient: Marquez Short   YOB: 2024   Date of Visit: 2023       Dear Dr. Bojorquez:    I saw your patient, Marquez Short, for an evaluation. Below are my notes for this visit with him.    If you have questions, please do not hesitate to call me.      Sincerely,        Thomas Mcleod MD        CC: No Recipients  Beronica Madsen PA-C  2023  3:38 PM  Signed  REASON FOR CONSULTATION/VISIT:  Chief Complaint   Patient presents with   • Consultation     Head shape        REFERRING PROVIDER:   Todd Bojorquez    HISTORY OF PRESENT ILLNESS:  Marquez Short is a 5 month old male here for evaluation of headshape. Patient is a twin liveborn born at 36 weeks to a  mother,  section delivery, otherwise uncomplicated pregnancy. The patient has been seen by their pediatrician, who noted an abnormality in the shape of the head about 2 months ago and recommended at home position changes, and tummy time. The grandmpther states that she noticed the left side of his forehead was more prominent than the right side, however about 1 month ago, this had resolved. With failure of conservative therapy over the last two months, including repositioning, they were referred to our clinic. There is no report of recent fevers or chills or illnesses. There are no abnormal findings on the most recent well-child visit. There is no family history of craniosynostosis.     ALLERGIES:  ALLERGIES:  No Known Allergies    CURRENT MEDICATIONS:   Current Outpatient Medications   Medication Sig Dispense Refill   • acetaminophen (Tylenol Childrens) 160 MG/5ML suspension Take 3 mLs by mouth every 4 hours as needed for Fever. 118 mL 0   • pediatric multivitamin (POLY-VI-SOL) solution Take 1 mL by mouth every 24 hours. Do not start before September 3, 2022. 50 mL 12     No current facility-administered  medications for this visit.       PAST MEDICAL AND SURGICAL HISTORY:  Past Medical History:   Diagnosis Date   • Infant born at 36 weeks gestation 20[birth year]   • No pertinent past medical history    • Twin birth, mate liveborn 2024     History reviewed. No pertinent surgical history.    BIRTH HISTORY:  Patient is a twin liveborn born at 36 weeks to a  mother,  section  delivery, otherwise uncomplicated pregnancy.    ROS:   Review of Systems   Constitutional: Negative for activity change, appetite change and fever.   HENT: PER HPI.  Eyes: Negative for discharge and redness.   Respiratory: Negative for cough and wheezing.    Cardiovascular: Negative for cyanosis.   Gastrointestinal: Negative for diarrhea and vomiting.   Musculoskeletal: Negative for extremity weakness.   Skin: Negative for color change and rash.   Neurological: Negative for seizures.   Hematological: Does not bruise/bleed easily.     PHYSICAL EXAM:  Constitutional: Alert, no acute distress.  Head: Atraumatic. Left posterior plagiocephaly and brachycephaly. Lef  ear is anterior to the right on bird's eye view.   2023: Cranial index is 115mm/133mm, resulting in a CI of 0.86. Oblique head measurements, right front to left back of 130 mm, and left front to right back of 135 mm resulting in a cranial vault asymmetry of 5mm. Anterior fontanelle is palpable and flat. No signs of craniosynostosis. There is no metopic ridge, trigonocephaly, or superior orbital rim retrusion. There is no saddle, or scaphocephaly. There is no harlequin deformity or nasal root deviation. There is no mastoid bulge or suture ridging.   Total head circumference: 43.2 cm.   Eyes: PERRL, EOMI  ENT: Moist mucous membranes. No cleft lip deformity. External ears appear normal.  Neck: no torticollis.  Respiratory: Non-labored breathing. Symmetrical chest wall expansion.  Cardiovascular: Extremities warm well perfused.  Musculoskeletal: No obvious  weakness.  Neurologic: Appropriate behavior. Cranial nerves 2-12 grossly intact.  Psychological: Appropriate mood and behavior.                IMPRESSION AND PLAN:  Marquez Short is a 5 month old male who presents to clinic for evaluation regarding their head shape. The patient has a cranial index of 0.86 and a cranial vault asymmetry of 5 mm. On clinical exam they have left sided posterior positional plagiocephaly. Head circumference was 43.2 cm, which is in the 48th percentile for their age. I discussed that their headshape falls just outside the normal limits and at this time we would not recommend helmet therapy. I recommended caring for his left sided plagiocephaly conservatively. I emphasized the importance of tummy time, repositioning while feeding, and alternation of position of stimuli at rest to ensure alternation of pressure on the head. I illustrated neck stretches for the family as well. I asked that the family return to clinic 2-3 months for re evaluation. We thank you for the opportunity to participate in this patient's care.    - Total Time: Greater than 45 minutes spent before the visit reviewing any existing imaging studies and available physician notes, with the patient reviewing history, examining, and discussing treatment, and after the visit coordinating care with providers and ordering any necessary studies.   -Family provided with information regarding diagnosis and treatment options. All questions were answered. Family verbalized understanding of education provided.   -Discussed current exam, diagnosis, natural progression, and treatment options.    Diagnosis:  Positional plagiocephaly  No orders of the defined types were placed in this encounter.      Thomas Mcleod MD  Pediatric Plastic Surgery  1/30/23               Implemented All Universal Safety Interventions:  [place] to call system. Call bell, personal items and telephone within reach. Instruct patient to call for assistance. Room bathroom lighting operational. Non-slip footwear when patient is off stretcher. Physically safe environment: no spills, clutter or unnecessary equipment. Stretcher in lowest position, wheels locked, appropriate side rails in place.

## 2024-01-12 NOTE — PATIENT PROFILE OB - VAGINAL DELIVERY TYPE, BABY A
Notified patient as below. Clarified with spouse multiple times that patient is to take a half dose both the night before and morning of surgery. Verbalized understanding of information given.    spontaneous

## 2024-05-16 NOTE — DISCHARGE NOTE OB - YES, WALK AS TOLERATED
Take and toss cups with water when feeding solid foods.   Wean off the pacifier by leaving it in his bed.     If you have an active [facility] account, please look for your well child questionnaire to come to your [facility] account before your next well child visit.   Statement Selected

## 2024-08-04 ENCOUNTER — NON-APPOINTMENT (OUTPATIENT)
Age: 34
End: 2024-08-04

## 2024-12-28 ENCOUNTER — NON-APPOINTMENT (OUTPATIENT)
Age: 34
End: 2024-12-28